# Patient Record
Sex: FEMALE | Race: WHITE | Employment: OTHER | URBAN - METROPOLITAN AREA
[De-identification: names, ages, dates, MRNs, and addresses within clinical notes are randomized per-mention and may not be internally consistent; named-entity substitution may affect disease eponyms.]

---

## 2017-02-16 PROCEDURE — 36415 COLL VENOUS BLD VENIPUNCTURE: CPT | Performed by: NURSE PRACTITIONER

## 2017-02-16 PROCEDURE — 85025 COMPLETE CBC W/AUTO DIFF WBC: CPT | Performed by: NURSE PRACTITIONER

## 2017-02-16 PROCEDURE — 80053 COMPREHEN METABOLIC PANEL: CPT | Performed by: NURSE PRACTITIONER

## 2017-05-26 PROBLEM — Z51.5 PALLIATIVE CARE STATUS: Status: ACTIVE | Noted: 2017-05-26

## 2017-12-14 ENCOUNTER — APPOINTMENT (OUTPATIENT)
Dept: GENERAL RADIOLOGY | Facility: HOSPITAL | Age: 82
DRG: 493 | End: 2017-12-14
Attending: EMERGENCY MEDICINE
Payer: MEDICARE

## 2017-12-14 ENCOUNTER — HOSPITAL ENCOUNTER (INPATIENT)
Facility: HOSPITAL | Age: 82
LOS: 5 days | Discharge: NURSING, REHAB OR LONG-TERM CARE | DRG: 493 | End: 2017-12-19
Attending: EMERGENCY MEDICINE | Admitting: INTERNAL MEDICINE
Payer: MEDICARE

## 2017-12-14 DIAGNOSIS — G20 PARKINSON'S DISEASE (HCC): ICD-10-CM

## 2017-12-14 DIAGNOSIS — S82.842A BIMALLEOLAR FRACTURE OF LEFT ANKLE, CLOSED, INITIAL ENCOUNTER: Primary | ICD-10-CM

## 2017-12-14 PROCEDURE — 93005 ELECTROCARDIOGRAM TRACING: CPT

## 2017-12-14 PROCEDURE — 85610 PROTHROMBIN TIME: CPT | Performed by: INTERNAL MEDICINE

## 2017-12-14 PROCEDURE — 80053 COMPREHEN METABOLIC PANEL: CPT | Performed by: INTERNAL MEDICINE

## 2017-12-14 PROCEDURE — 85025 COMPLETE CBC W/AUTO DIFF WBC: CPT | Performed by: INTERNAL MEDICINE

## 2017-12-14 PROCEDURE — 93010 ELECTROCARDIOGRAM REPORT: CPT | Performed by: INTERNAL MEDICINE

## 2017-12-14 PROCEDURE — 29515 APPLICATION SHORT LEG SPLINT: CPT

## 2017-12-14 PROCEDURE — 87641 MR-STAPH DNA AMP PROBE: CPT | Performed by: INTERNAL MEDICINE

## 2017-12-14 PROCEDURE — S0028 INJECTION, FAMOTIDINE, 20 MG: HCPCS | Performed by: INTERNAL MEDICINE

## 2017-12-14 PROCEDURE — 73610 X-RAY EXAM OF ANKLE: CPT | Performed by: EMERGENCY MEDICINE

## 2017-12-14 PROCEDURE — 99285 EMERGENCY DEPT VISIT HI MDM: CPT

## 2017-12-14 PROCEDURE — 96374 THER/PROPH/DIAG INJ IV PUSH: CPT

## 2017-12-14 PROCEDURE — 85730 THROMBOPLASTIN TIME PARTIAL: CPT | Performed by: INTERNAL MEDICINE

## 2017-12-14 PROCEDURE — 71020 XR CHEST PA + LAT CHEST (CPT=71020): CPT | Performed by: EMERGENCY MEDICINE

## 2017-12-14 RX ORDER — MORPHINE SULFATE 2 MG/ML
2 INJECTION, SOLUTION INTRAMUSCULAR; INTRAVENOUS EVERY 4 HOURS PRN
Status: DISCONTINUED | OUTPATIENT
Start: 2017-12-14 | End: 2017-12-19

## 2017-12-14 RX ORDER — FAMOTIDINE 10 MG/ML
20 INJECTION, SOLUTION INTRAVENOUS 2 TIMES DAILY
Status: DISCONTINUED | OUTPATIENT
Start: 2017-12-14 | End: 2017-12-15

## 2017-12-14 RX ORDER — HYDROCODONE BITARTRATE AND ACETAMINOPHEN 5; 325 MG/1; MG/1
1 TABLET ORAL EVERY 6 HOURS PRN
Status: DISCONTINUED | OUTPATIENT
Start: 2017-12-14 | End: 2017-12-19

## 2017-12-14 RX ORDER — MORPHINE SULFATE 4 MG/ML
4 INJECTION, SOLUTION INTRAMUSCULAR; INTRAVENOUS ONCE
Status: COMPLETED | OUTPATIENT
Start: 2017-12-14 | End: 2017-12-14

## 2017-12-14 RX ORDER — HYDROCODONE BITARTRATE AND ACETAMINOPHEN 5; 325 MG/1; MG/1
2 TABLET ORAL EVERY 6 HOURS PRN
Status: DISCONTINUED | OUTPATIENT
Start: 2017-12-14 | End: 2017-12-19

## 2017-12-14 RX ORDER — ACETAMINOPHEN 325 MG/1
650 TABLET ORAL EVERY 4 HOURS PRN
Status: DISCONTINUED | OUTPATIENT
Start: 2017-12-14 | End: 2017-12-19

## 2017-12-14 RX ORDER — DOXEPIN HYDROCHLORIDE 50 MG/1
1 CAPSULE ORAL DAILY
Status: DISCONTINUED | OUTPATIENT
Start: 2017-12-15 | End: 2017-12-19

## 2017-12-14 RX ORDER — GUAIFENESIN 600 MG
600 TABLET, EXTENDED RELEASE 12 HR ORAL 2 TIMES DAILY
Status: DISCONTINUED | OUTPATIENT
Start: 2017-12-14 | End: 2017-12-19

## 2017-12-14 RX ORDER — MORPHINE SULFATE 4 MG/ML
INJECTION, SOLUTION INTRAMUSCULAR; INTRAVENOUS
Status: COMPLETED
Start: 2017-12-14 | End: 2017-12-14

## 2017-12-14 RX ORDER — ACETAMINOPHEN 325 MG/1
325 TABLET ORAL EVERY 4 HOURS PRN
Status: DISCONTINUED | OUTPATIENT
Start: 2017-12-14 | End: 2017-12-14

## 2017-12-14 RX ORDER — ALBUTEROL SULFATE 2.5 MG/3ML
2.5 SOLUTION RESPIRATORY (INHALATION) EVERY 6 HOURS PRN
Status: DISCONTINUED | OUTPATIENT
Start: 2017-12-14 | End: 2017-12-19

## 2017-12-14 NOTE — CONSULTS
12/14/2017  Lizeth Pichardo  58/1933  80year old   female  No name on file.     HPI:   Patient presents with:  Lower Extremity Injury (musculoskeletal)  I was asked in consultation by Dr. Luisa Escudero to see and evaluate Lizeth Pichardo in consultation emma of Onset   • Breast Cancer Sister    • Hypertension Brother    • Stroke Brother    • coronary artery disease [Other] [OTHER] Father    • diverticulosis [Other] [OTHER] Mother       Smoking status: Former Smoker internal fixation of her bimalleolar ankle fracture. Risks include bleeding, infection, neurovascular injury, failure of the procedure, stiffness, and potential need for additional surgery as well as anesthetic complications including death.   Risks of fra

## 2017-12-14 NOTE — ED PROVIDER NOTES
Patient Seen in: [facility] AND [facility] Emergency Department    History   Patient presents with:  Lower Extremity Injury (musculoskeletal)    Stated Complaint: Fall, ankle swelling    HPI    Patient is an 80-year-old female with a long-standing history of sev Constitutional: She appears well-developed and well-nourished. HENT:   Head: Normocephalic and atraumatic. Eyes: Conjunctivae and EOM are normal. Pupils are equal, round, and reactive to light. Neck: Neck supple.    Cardiovascular: Normal rate, regula

## 2017-12-14 NOTE — PROGRESS NOTES
12/14/17 3419   Clinical Encounter Type   Visited With Patient and family together   Routine Visit Introduction   Continue Visiting Yes   Referral From Other (Comment)  (Consult)   Referral To    Patient Spiritual Encounters   Spiritual Assessme

## 2017-12-14 NOTE — ED INITIAL ASSESSMENT (HPI)
Pt was transferring from a commode to her bed at 1830 last night and she \"rolled\" her left ankle. Today has swelling, bruising to left ankle. PMSCs intact.

## 2017-12-14 NOTE — CM/SW NOTE
Met with patient and daughter for discharge planning. Patient lives in two stry home on second level with her  and a full time caregiver. She has a walker and bedside commonde at home.  Due to her Parkinsons she needs 1 assist with transfers and walk

## 2017-12-15 ENCOUNTER — APPOINTMENT (OUTPATIENT)
Dept: GENERAL RADIOLOGY | Facility: HOSPITAL | Age: 82
DRG: 493 | End: 2017-12-15
Attending: ORTHOPAEDIC SURGERY
Payer: MEDICARE

## 2017-12-15 ENCOUNTER — SURGERY (OUTPATIENT)
Age: 82
End: 2017-12-15

## 2017-12-15 ENCOUNTER — ANESTHESIA EVENT (OUTPATIENT)
Dept: SURGERY | Facility: HOSPITAL | Age: 82
End: 2017-12-15

## 2017-12-15 ENCOUNTER — ANESTHESIA (OUTPATIENT)
Dept: SURGERY | Facility: HOSPITAL | Age: 82
End: 2017-12-15

## 2017-12-15 PROCEDURE — BQ1H1ZZ FLUOROSCOPY OF LEFT ANKLE USING LOW OSMOLAR CONTRAST: ICD-10-PCS | Performed by: ORTHOPAEDIC SURGERY

## 2017-12-15 PROCEDURE — 51701 INSERT BLADDER CATHETER: CPT

## 2017-12-15 PROCEDURE — 76001 XR C-ARM FLUORO >1 HOUR  (CPT=76001): CPT | Performed by: ORTHOPAEDIC SURGERY

## 2017-12-15 PROCEDURE — 0QSH04Z REPOSITION LEFT TIBIA WITH INTERNAL FIXATION DEVICE, OPEN APPROACH: ICD-10-PCS | Performed by: ORTHOPAEDIC SURGERY

## 2017-12-15 PROCEDURE — 73600 X-RAY EXAM OF ANKLE: CPT | Performed by: ORTHOPAEDIC SURGERY

## 2017-12-15 PROCEDURE — 81003 URINALYSIS AUTO W/O SCOPE: CPT | Performed by: INTERNAL MEDICINE

## 2017-12-15 PROCEDURE — 0QSK04Z REPOSITION LEFT FIBULA WITH INTERNAL FIXATION DEVICE, OPEN APPROACH: ICD-10-PCS | Performed by: ORTHOPAEDIC SURGERY

## 2017-12-15 DEVICE — IMPLANTABLE DEVICE: Type: IMPLANTABLE DEVICE | Site: ANKLE | Status: FUNCTIONAL

## 2017-12-15 DEVICE — SCREW LOCKING 3.5X16 212.104: Type: IMPLANTABLE DEVICE | Site: ANKLE | Status: FUNCTIONAL

## 2017-12-15 DEVICE — SCREW LOCKING 3.5X14 212.103: Type: IMPLANTABLE DEVICE | Site: ANKLE | Status: FUNCTIONAL

## 2017-12-15 DEVICE — SCREW LOCKING 3.5X12 212.102: Type: IMPLANTABLE DEVICE | Site: ANKLE | Status: FUNCTIONAL

## 2017-12-15 DEVICE — SCREW LOCKING 3.5X20 212.106: Type: IMPLANTABLE DEVICE | Site: ANKLE | Status: FUNCTIONAL

## 2017-12-15 RX ORDER — SODIUM CHLORIDE 0.9 % (FLUSH) 0.9 %
10 SYRINGE (ML) INJECTION AS NEEDED
Status: DISCONTINUED | OUTPATIENT
Start: 2017-12-15 | End: 2017-12-19

## 2017-12-15 RX ORDER — HYDROMORPHONE HYDROCHLORIDE 1 MG/ML
0.6 INJECTION, SOLUTION INTRAMUSCULAR; INTRAVENOUS; SUBCUTANEOUS
Status: ACTIVE | OUTPATIENT
Start: 2017-12-15 | End: 2017-12-16

## 2017-12-15 RX ORDER — ACETAMINOPHEN 325 MG/1
650 TABLET ORAL EVERY 6 HOURS PRN
Status: DISCONTINUED | OUTPATIENT
Start: 2017-12-15 | End: 2017-12-19

## 2017-12-15 RX ORDER — HYDROMORPHONE HYDROCHLORIDE 1 MG/ML
0.4 INJECTION, SOLUTION INTRAMUSCULAR; INTRAVENOUS; SUBCUTANEOUS
Status: ACTIVE | OUTPATIENT
Start: 2017-12-15 | End: 2017-12-16

## 2017-12-15 RX ORDER — DIPHENHYDRAMINE HYDROCHLORIDE 50 MG/ML
12.5 INJECTION INTRAMUSCULAR; INTRAVENOUS EVERY 4 HOURS PRN
Status: ACTIVE | OUTPATIENT
Start: 2017-12-15 | End: 2017-12-16

## 2017-12-15 RX ORDER — ONDANSETRON 2 MG/ML
4 INJECTION INTRAMUSCULAR; INTRAVENOUS ONCE AS NEEDED
Status: ACTIVE | OUTPATIENT
Start: 2017-12-15 | End: 2017-12-15

## 2017-12-15 RX ORDER — ENOXAPARIN SODIUM 100 MG/ML
40 INJECTION SUBCUTANEOUS DAILY
Status: DISCONTINUED | OUTPATIENT
Start: 2017-12-16 | End: 2017-12-19

## 2017-12-15 RX ORDER — CEFAZOLIN SODIUM/WATER 2 G/20 ML
2 SYRINGE (ML) INTRAVENOUS
Status: COMPLETED | OUTPATIENT
Start: 2017-12-15 | End: 2017-12-15

## 2017-12-15 RX ORDER — SENNOSIDES 8.6 MG
17.2 TABLET ORAL NIGHTLY
Status: DISCONTINUED | OUTPATIENT
Start: 2017-12-15 | End: 2017-12-19

## 2017-12-15 RX ORDER — DIPHENHYDRAMINE HCL 25 MG
25 CAPSULE ORAL EVERY 4 HOURS PRN
Status: ACTIVE | OUTPATIENT
Start: 2017-12-15 | End: 2017-12-16

## 2017-12-15 RX ORDER — DOCUSATE SODIUM 100 MG/1
100 CAPSULE, LIQUID FILLED ORAL 2 TIMES DAILY
Status: DISCONTINUED | OUTPATIENT
Start: 2017-12-15 | End: 2017-12-19

## 2017-12-15 RX ORDER — HYDROCODONE BITARTRATE AND ACETAMINOPHEN 7.5; 325 MG/1; MG/1
1 TABLET ORAL EVERY 6 HOURS PRN
Status: DISCONTINUED | OUTPATIENT
Start: 2017-12-15 | End: 2017-12-19

## 2017-12-15 RX ORDER — SODIUM CHLORIDE, SODIUM LACTATE, POTASSIUM CHLORIDE, CALCIUM CHLORIDE 600; 310; 30; 20 MG/100ML; MG/100ML; MG/100ML; MG/100ML
INJECTION, SOLUTION INTRAVENOUS
Status: COMPLETED
Start: 2017-12-15 | End: 2017-12-15

## 2017-12-15 RX ORDER — SODIUM CHLORIDE, SODIUM LACTATE, POTASSIUM CHLORIDE, CALCIUM CHLORIDE 600; 310; 30; 20 MG/100ML; MG/100ML; MG/100ML; MG/100ML
INJECTION, SOLUTION INTRAVENOUS CONTINUOUS PRN
Status: DISCONTINUED | OUTPATIENT
Start: 2017-12-15 | End: 2017-12-15 | Stop reason: SURG

## 2017-12-15 RX ORDER — HYDROCODONE BITARTRATE AND ACETAMINOPHEN 5; 325 MG/1; MG/1
2 TABLET ORAL EVERY 4 HOURS PRN
Status: DISCONTINUED | OUTPATIENT
Start: 2017-12-15 | End: 2017-12-19

## 2017-12-15 RX ORDER — SODIUM CHLORIDE, SODIUM LACTATE, POTASSIUM CHLORIDE, CALCIUM CHLORIDE 600; 310; 30; 20 MG/100ML; MG/100ML; MG/100ML; MG/100ML
INJECTION, SOLUTION INTRAVENOUS CONTINUOUS
Status: DISCONTINUED | OUTPATIENT
Start: 2017-12-15 | End: 2017-12-19

## 2017-12-15 RX ORDER — BISACODYL 10 MG
10 SUPPOSITORY, RECTAL RECTAL
Status: DISCONTINUED | OUTPATIENT
Start: 2017-12-15 | End: 2017-12-19

## 2017-12-15 RX ORDER — NALOXONE HYDROCHLORIDE 0.4 MG/ML
0.08 INJECTION, SOLUTION INTRAMUSCULAR; INTRAVENOUS; SUBCUTANEOUS
Status: ACTIVE | OUTPATIENT
Start: 2017-12-15 | End: 2017-12-16

## 2017-12-15 RX ORDER — HYDROCODONE BITARTRATE AND ACETAMINOPHEN 5; 325 MG/1; MG/1
1 TABLET ORAL EVERY 4 HOURS PRN
Status: DISCONTINUED | OUTPATIENT
Start: 2017-12-15 | End: 2017-12-19

## 2017-12-15 RX ORDER — HYDROCODONE BITARTRATE AND ACETAMINOPHEN 5; 325 MG/1; MG/1
2 TABLET ORAL AS NEEDED
Status: DISCONTINUED | OUTPATIENT
Start: 2017-12-15 | End: 2017-12-15 | Stop reason: HOSPADM

## 2017-12-15 RX ORDER — ACETAMINOPHEN 325 MG/1
650 TABLET ORAL EVERY 4 HOURS PRN
Status: DISCONTINUED | OUTPATIENT
Start: 2017-12-15 | End: 2017-12-19

## 2017-12-15 RX ORDER — POLYETHYLENE GLYCOL 3350 17 G/17G
17 POWDER, FOR SOLUTION ORAL DAILY PRN
Status: DISCONTINUED | OUTPATIENT
Start: 2017-12-15 | End: 2017-12-19

## 2017-12-15 RX ORDER — HYDROMORPHONE HYDROCHLORIDE 1 MG/ML
0.6 INJECTION, SOLUTION INTRAMUSCULAR; INTRAVENOUS; SUBCUTANEOUS EVERY 5 MIN PRN
Status: DISCONTINUED | OUTPATIENT
Start: 2017-12-15 | End: 2017-12-15 | Stop reason: HOSPADM

## 2017-12-15 RX ORDER — FAMOTIDINE 10 MG/ML
20 INJECTION, SOLUTION INTRAVENOUS NIGHTLY
Status: DISCONTINUED | OUTPATIENT
Start: 2017-12-15 | End: 2017-12-19

## 2017-12-15 RX ORDER — CEFAZOLIN SODIUM/WATER 2 G/20 ML
2 SYRINGE (ML) INTRAVENOUS EVERY 8 HOURS
Status: COMPLETED | OUTPATIENT
Start: 2017-12-16 | End: 2017-12-16

## 2017-12-15 RX ORDER — MORPHINE SULFATE 2 MG/ML
2 INJECTION, SOLUTION INTRAMUSCULAR; INTRAVENOUS EVERY 10 MIN PRN
Status: DISCONTINUED | OUTPATIENT
Start: 2017-12-15 | End: 2017-12-15 | Stop reason: HOSPADM

## 2017-12-15 RX ORDER — NALOXONE HYDROCHLORIDE 0.4 MG/ML
80 INJECTION, SOLUTION INTRAMUSCULAR; INTRAVENOUS; SUBCUTANEOUS AS NEEDED
Status: DISCONTINUED | OUTPATIENT
Start: 2017-12-15 | End: 2017-12-15 | Stop reason: HOSPADM

## 2017-12-15 RX ORDER — NALBUPHINE HCL 10 MG/ML
2.5 AMPUL (ML) INJECTION EVERY 4 HOURS PRN
Status: DISCONTINUED | OUTPATIENT
Start: 2017-12-15 | End: 2017-12-19

## 2017-12-15 RX ORDER — MORPHINE SULFATE 10 MG/ML
6 INJECTION, SOLUTION INTRAMUSCULAR; INTRAVENOUS EVERY 10 MIN PRN
Status: DISCONTINUED | OUTPATIENT
Start: 2017-12-15 | End: 2017-12-15 | Stop reason: HOSPADM

## 2017-12-15 RX ORDER — ONDANSETRON 2 MG/ML
4 INJECTION INTRAMUSCULAR; INTRAVENOUS ONCE AS NEEDED
Status: DISCONTINUED | OUTPATIENT
Start: 2017-12-15 | End: 2017-12-15 | Stop reason: HOSPADM

## 2017-12-15 RX ORDER — SODIUM PHOSPHATE, DIBASIC AND SODIUM PHOSPHATE, MONOBASIC 7; 19 G/133ML; G/133ML
1 ENEMA RECTAL ONCE AS NEEDED
Status: DISCONTINUED | OUTPATIENT
Start: 2017-12-15 | End: 2017-12-19

## 2017-12-15 RX ORDER — SODIUM CHLORIDE, SODIUM LACTATE, POTASSIUM CHLORIDE, CALCIUM CHLORIDE 600; 310; 30; 20 MG/100ML; MG/100ML; MG/100ML; MG/100ML
INJECTION, SOLUTION INTRAVENOUS CONTINUOUS
Status: DISCONTINUED | OUTPATIENT
Start: 2017-12-15 | End: 2017-12-15 | Stop reason: HOSPADM

## 2017-12-15 RX ORDER — MORPHINE SULFATE 4 MG/ML
4 INJECTION, SOLUTION INTRAMUSCULAR; INTRAVENOUS EVERY 10 MIN PRN
Status: DISCONTINUED | OUTPATIENT
Start: 2017-12-15 | End: 2017-12-15 | Stop reason: HOSPADM

## 2017-12-15 RX ORDER — HYDROCODONE BITARTRATE AND ACETAMINOPHEN 7.5; 325 MG/1; MG/1
2 TABLET ORAL EVERY 6 HOURS PRN
Status: DISCONTINUED | OUTPATIENT
Start: 2017-12-15 | End: 2017-12-19

## 2017-12-15 RX ORDER — 0.9 % SODIUM CHLORIDE 0.9 %
VIAL (ML) INJECTION
Status: DISPENSED
Start: 2017-12-15 | End: 2017-12-16

## 2017-12-15 RX ORDER — HYDROMORPHONE HYDROCHLORIDE 1 MG/ML
0.4 INJECTION, SOLUTION INTRAMUSCULAR; INTRAVENOUS; SUBCUTANEOUS EVERY 5 MIN PRN
Status: DISCONTINUED | OUTPATIENT
Start: 2017-12-15 | End: 2017-12-15 | Stop reason: HOSPADM

## 2017-12-15 RX ORDER — HYDROCODONE BITARTRATE AND ACETAMINOPHEN 5; 325 MG/1; MG/1
1 TABLET ORAL AS NEEDED
Status: DISCONTINUED | OUTPATIENT
Start: 2017-12-15 | End: 2017-12-15 | Stop reason: HOSPADM

## 2017-12-15 RX ORDER — HYDROMORPHONE HYDROCHLORIDE 1 MG/ML
0.2 INJECTION, SOLUTION INTRAMUSCULAR; INTRAVENOUS; SUBCUTANEOUS EVERY 5 MIN PRN
Status: DISCONTINUED | OUTPATIENT
Start: 2017-12-15 | End: 2017-12-15 | Stop reason: HOSPADM

## 2017-12-15 RX ADMIN — CEFAZOLIN SODIUM/WATER 2 G: 2 G/20 ML SYRINGE (ML) INTRAVENOUS at 16:44:00

## 2017-12-15 RX ADMIN — SODIUM CHLORIDE, SODIUM LACTATE, POTASSIUM CHLORIDE, CALCIUM CHLORIDE: 600; 310; 30; 20 INJECTION, SOLUTION INTRAVENOUS at 18:21:00

## 2017-12-15 RX ADMIN — SODIUM CHLORIDE, SODIUM LACTATE, POTASSIUM CHLORIDE, CALCIUM CHLORIDE: 600; 310; 30; 20 INJECTION, SOLUTION INTRAVENOUS at 16:24:00

## 2017-12-15 NOTE — PROGRESS NOTES
[facility] Note:  Renal Dose Adjustment    Saundra Carrel has been prescribed famotidine (PEPCID) 20 mg intravenously every 12 hours. Estimated Creatinine Clearance: 36.9 mL/min (based on SCr of 0.94 mg/dL).     Her calculated creatinine clearance i

## 2017-12-15 NOTE — ANESTHESIA PROCEDURE NOTES
Spinal Block  Performed by: Kathrine Pap by: Myah Chao     Patient Location:  OR  Start Time:  12/15/2017 4:37 PM  End Time:  12/15/2017 4:43 PM  Site identification: surface landmarks    Anesthesiologist:  Myah Chao  Performed by

## 2017-12-15 NOTE — PLAN OF CARE
DISCHARGE PLANNING    • Discharge to home or other facility with appropriate resources Progressing        MUSCULOSKELETAL - ADULT    • Maintain proper alignment of affected body part Progressing        PAIN - ADULT    • Verbalizes/displays adequate comfort

## 2017-12-15 NOTE — PROGRESS NOTES
[facility]    Progress Note    Kip Rodrigues Patient Status:  Inpatient    1933 MRN [id number]   Location [facility] 4W/SW/SE Attending Francia Dumas MD   Hosp Day # 1 PCP Kathy Parisi MD        Subjective:     Winnie Reese discussed above with instability of the tibiotalar joint. Correlate clinically with post reduction imaging advised.          Ekg 12-lead    Result Date: 12/14/2017  ECG Report  Interpretation  --------------------------         Stephanie Trinidad MD  12/15/

## 2017-12-15 NOTE — ANESTHESIA PREPROCEDURE EVALUATION
Anesthesia PreOp Note    HPI:     Manjula Thapa is a 80year old female who presents for preoperative consultation requested by: Cody Yost MD    Date of Surgery: 12/14/2017 - 12/15/2017    Procedure(s):  ANKLE OPEN REDUCTION INTERNAL FIXATION exceed 10 days at at time Disp: 60 g Rfl: 0        Current Facility-Administered Medications Ordered in Epic:  [MAR Hold] ceFAZolin sodium (ANCEF/KEFZOL) 2 GM/20ML premix IV syringe 2 g 2 g Intravenous On Call to Aylin Lee MD    [facility] Hold] humbertooT labs reviewed.     Lab Results  Component Value Date   WBC 8.8 12/14/2017   RBC 3.89 12/14/2017   HGB 12.2 12/14/2017   HCT 36.1 12/14/2017   MCV 92.8 12/14/2017   MCH 31.3 12/14/2017   MCHC 33.7 12/14/2017   RDW 14.0 12/14/2017    12/14/2017   MPV 8 DARREN YOON  12/15/2017 4:21 PM

## 2017-12-15 NOTE — CM/SW NOTE
12/15-Dr. Pal [facility] informed this Writer that the Patient's admission will be inpatient. This Writer met with the Patient's son All Howard ([phone]) and Napoleon Gifford ([phone]) at bedside in regards to discharge planning.  The Patient's son is requesting a

## 2017-12-15 NOTE — H&P
[address] [facility] Patient Status:  Inpatient    1933 MRN [id number]   Location [facility] 4W/SW/SE Attending Carissa French MD   Hosp Day # 0 PCP Wiley Mcdowell MD     Date:  2017 aorta [facility])     CT of abdomen done February 2013   • H/O hernia repair    • HTN (hypertension)    • Parkinson disease ([facility] [address]      Past Surgical History:  No date: OTHER      Comment: bilateral hip replacement  No date: OTHER      Comment: left inguinal kelechi Denies h/o anemia; denies bruising or excessive bleeding   ENDOCRINE:  Denies excessive thirst or urination; denies unexpected wt gain or wt loss   ALLERGY/IMMUN: Denies food or seasonal allergies       Physical Exam:   /53 (BP Location: Right arm) Assessment/Plan:     Principal Problem:     Bimalleolar fracture of left ankle, closed, initial encounter  Seen by ortho and case discussed with consultant.   Surgical repair is recommended and family in agreement.  -will check EKG, UA and labs  -keep

## 2017-12-16 PROBLEM — D62 ANEMIA ASSOCIATED WITH ACUTE BLOOD LOSS: Status: ACTIVE | Noted: 2017-12-16

## 2017-12-16 PROCEDURE — 97167 OT EVAL HIGH COMPLEX 60 MIN: CPT

## 2017-12-16 PROCEDURE — 97163 PT EVAL HIGH COMPLEX 45 MIN: CPT

## 2017-12-16 PROCEDURE — 80048 BASIC METABOLIC PNL TOTAL CA: CPT | Performed by: ORTHOPAEDIC SURGERY

## 2017-12-16 PROCEDURE — 85027 COMPLETE CBC AUTOMATED: CPT | Performed by: ORTHOPAEDIC SURGERY

## 2017-12-16 NOTE — OPERATIVE REPORT
[facility]    PATIENT'S NAME: Juan Luis Ocampo   ATTENDING PHYSICIAN: Senait Moore MD   OPERATING PHYSICIAN: Barbara Espinoza MD   PATIENT ACCOUNT#:   [id number]    LOCATION:  [address] [place] [address] #:   [id number]       DATE OF table in a supine position. Spinal anesthesia was induced along with sedation. She was given Ancef as antibiotic prophylaxis within 1 hour of incision time. Her left upper extremity was prepped and draped in sterile fashion.   A tourniquet was inflated t Prolene skin suture. A large sterile bulky soft dressing was placed followed by a long leg splint with posterior mold and sugar tong component. The patient had a tourniquet deflated following wound closure.   Blood loss was minimal.  Complications were no

## 2017-12-16 NOTE — ANESTHESIA POSTPROCEDURE EVALUATION
Patient: Elida Nicholas    Procedure Summary     Date:  12/15/17 Room / Location:  [facility] OR [address] [place] [address] OR    Anesthesia Start:  5925 Anesthesia Stop:      Procedure:  ANKLE OPEN REDUCTION INTERNAL FIXATION (Left ) Diagnosis:       Bimalleolar frac

## 2017-12-16 NOTE — PHYSICAL THERAPY NOTE
PHYSICAL THERAPY EVALUATION - INPATIENT     Room Number: 415/415-A  Evaluation Date: 12/16/2017  Type of Evaluation: Initial  Physician Order: PT Eval and Treat (bed to chair transfer, L HERMELINDA BLACKBURN, fall risk prevention)    Presenting Problem: L bimalleola of motion;Strengthening;Transfer training  Rehab Potential : Guarded  Frequency (Obs): Daily       PHYSICAL THERAPY MEDICAL/SOCIAL HISTORY     History related to current admission: ORIF on 12/15 by Dr. Ramón Fernando.     Problem List  Principal Problem:    Ángel Aldrich L Lower Extremity: Non-Weight Bearing    PAIN ASSESSMENT  Rating: Other (Comment) (pt points to L ankle when asked to point to pain)  Location: L ankle  Management Techniques: Activity promotion; Body mechanics; Relaxation;Repositioning    COGNITION supine>sit    Transfers: SPT max A x2, with assist from 3rd helper to maintain L LE NWB    Exercise/Education Provided:  Bed mobility  Body mechanics  Functional activity tolerated  Transfer training    Patient End of Session: Up in chair;Needs met;Call li

## 2017-12-16 NOTE — BRIEF OP NOTE
Pre-Operative Diagnosis: Bimalleolar fracture of left ankle, closed, initial encounter [S82.842J]     Post-Operative Diagnosis: Bimalleolar fracture of left ankle, closed, initial encounter [P41.002K]     Procedure Performed:   Procedure(s):  open reduc

## 2017-12-16 NOTE — ANESTHESIA POST-OP FOLLOW-UP NOTE
[facility]   Acute Pain Rounds Note  2017    Patient name: Alex Pham 80year old female  : 1933  MRN: [id number]    Diagnosis: (Y38.525V) Bimalleolar fracture of left ankle, closed, initial encounter  (primary encounter

## 2017-12-16 NOTE — PROGRESS NOTES
[place] [facility] - [facility]    Progress Note    Jean-Claude Abel Patient Status:  Inpatient    1933 MRN [id number]   Location [facility] 4W/SW/SE Attending Artem Guerrero MD   Hosp Day # 2 PCP Hortencia Rubio MD       Subjective:   Cath 12/14/2017  CONCLUSION:  1. Fractures of the lateral and medial malleoli as discussed above with instability of the tibiotalar joint. Correlate clinically with post reduction imaging advised.          Xr Ankle (2 View), Left (cpt=73600)    Result Date: 12/1

## 2017-12-16 NOTE — PROGRESS NOTES
12/14/2017  Ingrid Mitchell  58/1933  80year old   female  No name on file. HPI:   Patient presents with:  Lower Extremity Injury (musculoskeletal)      The patient complains of pain located left ankle. The pain is controlled.   The patient denies n

## 2017-12-17 PROBLEM — E44.0 MODERATE PROTEIN-CALORIE MALNUTRITION (HCC): Status: ACTIVE | Noted: 2017-12-17

## 2017-12-17 PROCEDURE — 97530 THERAPEUTIC ACTIVITIES: CPT

## 2017-12-17 PROCEDURE — 85025 COMPLETE CBC W/AUTO DIFF WBC: CPT | Performed by: INTERNAL MEDICINE

## 2017-12-17 PROCEDURE — 80069 RENAL FUNCTION PANEL: CPT | Performed by: INTERNAL MEDICINE

## 2017-12-17 NOTE — PLAN OF CARE
Problem: Patient/Family Goals  Goal: Patient/Family Short Term Goal  Patient's Short Term Goal:  Increase activity as toleraetd  Interventions:   - PT/OT working with patient  - patient assisted up to chair today with PT  - patient upright for meals  - See

## 2017-12-17 NOTE — PHYSICAL THERAPY NOTE
PHYSICAL THERAPY TREATMENT NOTE - INPATIENT    Room Number: 517/886-X       Presenting Problem: L bimalleolar ankle fracture    Problem List  Principal Problem:    Bimalleolar fracture of left ankle, closed, initial encounter  Active Problems:    [facility]           Static Standing: Dependent  Dynamic Standing: Dependent    ACTIVITY TOLERANCE  Liters of O2:  1    AM-PAC '6-Clicks' INPATIENT SHORT FORM - BASIC MOBILITY  How much difficulty does the patient currently have. ..  -   Turning over in bed (includi in preparation for discharge, with SV/min A from primary caregiver.     Goal #5   Current Status IN PROGRESS   Goal #6    Goal #6  Current Status

## 2017-12-17 NOTE — PROGRESS NOTES
[address] Patient Status:  Inpatient    1933 MRN [id number]   Location [facility] 4W/SW/SE Attending Aaron Gary MD   Hosp Day # 3 PCP Anamika Tello MD     Americati Blue is a 80year old fe dry)    Assessment:    Condition: In stable condition. (S/p ankle ORIF 12/15    1. Will cont PT as able; importance of non weight bearing was discussed at length with the daughter and pt  2. Will d/c to rehab when bed available).

## 2017-12-17 NOTE — PLAN OF CARE
DISCHARGE PLANNING    • Discharge to home or other facility with appropriate resources Progressing    Plan is to discharge to a rehab facility, family still unsure of location, Son was going to visit places today near him. SW on consult.      Lizbeth Falcon

## 2017-12-17 NOTE — PROGRESS NOTES
[facility]    Progress Note    Aleida Atkinson Patient Status:  Inpatient    1933 MRN [id number]   Location [facility] 4W/SW/SE Attending Kirstie Richards MD   Hosp Day # 3 PCP Shane Dean MD       Subjective:   Cath ORIF of bimalleolar left ankle fracture. Xr C-arm Fluoro >1 Hour  (cpt=76001)    Result Date: 12/15/2017  CONCLUSION: FLUOROSCOPY WAS PROVIDED IN THE OPERATING ROOM FOR DR. MELENDREZ. A RADIOLOGIST WAS NOT IN ATTENDANCE DURING THE PROCEDURE.

## 2017-12-18 PROCEDURE — 85025 COMPLETE CBC W/AUTO DIFF WBC: CPT | Performed by: INTERNAL MEDICINE

## 2017-12-18 PROCEDURE — 97530 THERAPEUTIC ACTIVITIES: CPT

## 2017-12-18 PROCEDURE — S0028 INJECTION, FAMOTIDINE, 20 MG: HCPCS | Performed by: INTERNAL MEDICINE

## 2017-12-18 PROCEDURE — 80069 RENAL FUNCTION PANEL: CPT | Performed by: INTERNAL MEDICINE

## 2017-12-18 RX ORDER — HYDROCHLOROTHIAZIDE 25 MG/1
25 TABLET ORAL DAILY
Status: DISCONTINUED | OUTPATIENT
Start: 2017-12-18 | End: 2017-12-19

## 2017-12-18 NOTE — PROGRESS NOTES
12/14/2017  Cristian Aragon  58/1933  80year old   female  No name on file. HPI:   Patient presents with:  Lower Extremity Injury (musculoskeletal)      The patient complains of pain located left leg. The pain is controlled and mild.   The patient d

## 2017-12-18 NOTE — PROGRESS NOTES
[place] [facility] - [facility]    Progress Note    Wendy Jones Patient Status:  Inpatient    1933 MRN [id number]   Location [facility] 4W/SW/SE Attending Jelani Lees MD   Hosp Day # 4 PCP Luz Blanton MD     Subjective:     Nicole Gracia MD  12/18/2017

## 2017-12-18 NOTE — PHYSICAL THERAPY NOTE
PHYSICAL THERAPY TREATMENT NOTE - INPATIENT    Room Number: 288/809-D       Presenting Problem: L bimalleolar ankle fracture    Problem List  Principal Problem:    Bimalleolar fracture of left ankle, closed, initial encounter  Active Problems:    [facility] Static Sitting: Fair -  Dynamic Sitting: Fair -           Static Standing: Dependent  Dynamic Standing: Dependent    ACTIVITY TOLERANCE  Room air    AM-PAC '6-Clicks' INPATIENT SHORT FORM - BASIC MOBILITY max A    Goal #4 Stairs are not a goal at this time. Goal #4   Current Status NT   Goal #5 Patient to demonstrate home activity/exercise instructions provided to patient in preparation for discharge, with SV/min A from primary caregiver.     Goal #5   Cu

## 2017-12-18 NOTE — DIETARY NOTE
ADULT NUTRITION INITIAL ASSESSMENT    Pt is at no nutrition risk. Pt does not meet malnutrition criteria. NUTRITION DIAGNOSIS/PROBLEM: No nutrition diagnosis     PATIENT STATUS: Received nursing consult to evaluate patient's nutritional status.  No hi

## 2017-12-18 NOTE — CM/SW NOTE
Explanation of of BPCI/Medicare program provided. Patient was enrolled under . Patient/family agreed to phone f/u for 3 months from [facility] after discharge from [address] [place] [address] - f/u phone calls to be taken by staff at rehab facility.  BPCI/Medicare douglas

## 2017-12-19 VITALS
TEMPERATURE: 99 F | SYSTOLIC BLOOD PRESSURE: 157 MMHG | WEIGHT: 140 LBS | HEART RATE: 84 BPM | BODY MASS INDEX: 24.8 KG/M2 | RESPIRATION RATE: 20 BRPM | DIASTOLIC BLOOD PRESSURE: 76 MMHG | HEIGHT: 63 IN | OXYGEN SATURATION: 92 %

## 2017-12-19 PROCEDURE — 80048 BASIC METABOLIC PNL TOTAL CA: CPT | Performed by: INTERNAL MEDICINE

## 2017-12-19 PROCEDURE — 97110 THERAPEUTIC EXERCISES: CPT

## 2017-12-19 PROCEDURE — 85025 COMPLETE CBC W/AUTO DIFF WBC: CPT | Performed by: INTERNAL MEDICINE

## 2017-12-19 PROCEDURE — 97530 THERAPEUTIC ACTIVITIES: CPT

## 2017-12-19 RX ORDER — HYDROCHLOROTHIAZIDE 25 MG/1
25 TABLET ORAL DAILY
Qty: 30 TABLET | Refills: 3 | Status: SHIPPED | OUTPATIENT
Start: 2017-12-20

## 2017-12-19 NOTE — PROGRESS NOTES
[facility]    Progress Note    Reema Rissa Patient Status:  Inpatient    1933 MRN [id number]   Location [facility] 4W/SW/SE Attending Slade Harding MD   Hosp Day # 5 PCP Eva Headley MD     Subjective:     Cons

## 2017-12-19 NOTE — CM/SW NOTE
The pt. Has been accepted at [facility] in [facility]. The pt. Is scheduled to discharge to [facility] today 12/19 at 6p, via ambulance due to being a max assist of 2. The pt.'s son Yang Loaiza is aware and agreeable to discharge.       Report

## 2017-12-19 NOTE — PHYSICAL THERAPY NOTE
PHYSICAL THERAPY TREATMENT NOTE - INPATIENT    Room Number: 681/344-X       Presenting Problem: L bimalleolar ankle fracture    Problem List  Principal Problem:    Bimalleolar fracture of left ankle, closed, initial encounter  Active Problems:    [facility] Static Sitting: Fair  Dynamic Sitting: Fair -           Static Standing: Dependent  Dynamic Standing: Dependent    ACTIVITY TOLERANCE  Room air    AM-PAC '6-Clicks' INPATIENT SHORT FORM - BASIC MOBILITY  How much difficulty does the patien Status NT performed SPT with max A x 2   Goal #4 Stairs are not a goal at this time.     Goal #4   Current Status NT   Goal #5 Patient to demonstrate home activity/exercise instructions provided to patient in preparation for discharge, with SV/min A from pr

## 2017-12-19 NOTE — CM/SW NOTE
SW received a call from the pt's son Ashly El [phone] regarding his rehab choice. Ashly El would like a referral to the [facility] in [facility]. Referral made and DON screen previously requested.       [facility] ext [phone]

## 2017-12-20 NOTE — DISCHARGE SUMMARY
[place] [facility] - [facility]    Discharge Summary    Jean-Claude Abel Patient Status:  Inpatient    1933 MRN [id number]   Location [facility] 4W/SW/SE Attending No att. providers found   Hosp Day # 5 PCP Hortencia Rubio MD     Date of Admis HOURS AS NEEDED   Quantity:  180 mL  Refills:  1        CONTINUE taking these medications      Instructions Prescription details   MUCINEX MAXIMUM STRENGTH 1200 MG Tb12  Generic drug:  GuaiFENesin ER      Take 1 tablet (1,200 mg total) by mouth 2 (two) chaim

## 2018-01-25 PROBLEM — T81.30XA WOUND DEHISCENCE: Status: ACTIVE | Noted: 2018-01-25

## 2018-01-25 PROBLEM — S82.842D CLOSED DISPLACED BIMALLEOLAR FRACTURE OF LEFT LOWER LEG WITH ROUTINE HEALING: Status: ACTIVE | Noted: 2018-01-25

## 2018-01-27 RX ORDER — GUAIFENESIN AND DEXTROMETHORPHAN HYDROBROMIDE 100; 10 MG/5ML; MG/5ML
10 SOLUTION ORAL EVERY 6 HOURS PRN
COMMUNITY

## 2018-01-27 RX ORDER — ENOXAPARIN SODIUM 150 MG/ML
60 INJECTION SUBCUTANEOUS NIGHTLY
COMMUNITY
End: 2018-02-01

## 2018-01-27 RX ORDER — ENOXAPARIN SODIUM 150 MG/ML
80 INJECTION SUBCUTANEOUS DAILY
COMMUNITY
End: 2018-02-01

## 2018-01-27 RX ORDER — POTASSIUM CHLORIDE 20 MEQ/1
20 TABLET, EXTENDED RELEASE ORAL DAILY
COMMUNITY

## 2018-01-27 RX ORDER — ACETAMINOPHEN 325 MG/1
650 TABLET ORAL EVERY 6 HOURS PRN
COMMUNITY

## 2018-01-27 RX ORDER — HYDROCODONE BITARTRATE AND ACETAMINOPHEN 5; 325 MG/1; MG/1
1 TABLET ORAL EVERY 6 HOURS PRN
COMMUNITY
End: 2018-02-01

## 2018-01-27 NOTE — PAT NURSING NOTE
Spoke with Bobo Orozco RN at [facility], she states last dose of Lovenox as instructed by Dr. Ximena Canchola  is to be given 24 hours pre-op.

## 2018-01-30 ENCOUNTER — APPOINTMENT (OUTPATIENT)
Dept: GENERAL RADIOLOGY | Facility: HOSPITAL | Age: 83
DRG: 908 | End: 2018-01-30
Attending: ORTHOPAEDIC SURGERY
Payer: MEDICARE

## 2018-01-30 ENCOUNTER — HOSPITAL ENCOUNTER (INPATIENT)
Facility: HOSPITAL | Age: 83
LOS: 2 days | Discharge: NURSING, REHAB OR LONG-TERM CARE | DRG: 908 | End: 2018-02-01
Attending: ORTHOPAEDIC SURGERY | Admitting: ORTHOPAEDIC SURGERY
Payer: MEDICARE

## 2018-01-30 ENCOUNTER — SURGERY (OUTPATIENT)
Age: 83
End: 2018-01-30

## 2018-01-30 ENCOUNTER — ANESTHESIA EVENT (OUTPATIENT)
Dept: SURGERY | Facility: HOSPITAL | Age: 83
DRG: 908 | End: 2018-01-30
Payer: MEDICARE

## 2018-01-30 ENCOUNTER — ANESTHESIA (OUTPATIENT)
Dept: SURGERY | Facility: HOSPITAL | Age: 83
DRG: 908 | End: 2018-01-30
Payer: MEDICARE

## 2018-01-30 DIAGNOSIS — T81.30XA WOUND DEHISCENCE: Primary | ICD-10-CM

## 2018-01-30 LAB — MRSA DNA SPEC QL NAA+PROBE: NEGATIVE

## 2018-01-30 PROCEDURE — 0HDNXZZ EXTRACTION OF LEFT FOOT SKIN, EXTERNAL APPROACH: ICD-10-PCS | Performed by: ORTHOPAEDIC SURGERY

## 2018-01-30 PROCEDURE — 73610 X-RAY EXAM OF ANKLE: CPT | Performed by: ORTHOPAEDIC SURGERY

## 2018-01-30 PROCEDURE — 0JQR0ZZ REPAIR LEFT FOOT SUBCUTANEOUS TISSUE AND FASCIA, OPEN APPROACH: ICD-10-PCS | Performed by: ORTHOPAEDIC SURGERY

## 2018-01-30 PROCEDURE — 87641 MR-STAPH DNA AMP PROBE: CPT | Performed by: ORTHOPAEDIC SURGERY

## 2018-01-30 PROCEDURE — A4216 STERILE WATER/SALINE, 10 ML: HCPCS | Performed by: ORTHOPAEDIC SURGERY

## 2018-01-30 PROCEDURE — 0SPG04Z REMOVAL OF INTERNAL FIXATION DEVICE FROM LEFT ANKLE JOINT, OPEN APPROACH: ICD-10-PCS | Performed by: ORTHOPAEDIC SURGERY

## 2018-01-30 PROCEDURE — 76001 XR C-ARM FLUORO >1 HOUR  (CPT=76001): CPT | Performed by: ORTHOPAEDIC SURGERY

## 2018-01-30 PROCEDURE — 88300 SURGICAL PATH GROSS: CPT | Performed by: ORTHOPAEDIC SURGERY

## 2018-01-30 RX ORDER — CEFAZOLIN SODIUM/WATER 2 G/20 ML
2 SYRINGE (ML) INTRAVENOUS ONCE
Status: COMPLETED | OUTPATIENT
Start: 2018-01-30 | End: 2018-01-30

## 2018-01-30 RX ORDER — HYDROCODONE BITARTRATE AND ACETAMINOPHEN 5; 325 MG/1; MG/1
1 TABLET ORAL EVERY 4 HOURS PRN
Status: DISCONTINUED | OUTPATIENT
Start: 2018-01-30 | End: 2018-02-01

## 2018-01-30 RX ORDER — MORPHINE SULFATE 2 MG/ML
1 INJECTION, SOLUTION INTRAMUSCULAR; INTRAVENOUS EVERY 2 HOUR PRN
Status: DISCONTINUED | OUTPATIENT
Start: 2018-01-30 | End: 2018-02-01

## 2018-01-30 RX ORDER — HYDROCHLOROTHIAZIDE 25 MG/1
25 TABLET ORAL DAILY
Status: DISCONTINUED | OUTPATIENT
Start: 2018-01-30 | End: 2018-02-01

## 2018-01-30 RX ORDER — MORPHINE SULFATE 10 MG/ML
6 INJECTION, SOLUTION INTRAMUSCULAR; INTRAVENOUS EVERY 10 MIN PRN
Status: DISCONTINUED | OUTPATIENT
Start: 2018-01-30 | End: 2018-01-30 | Stop reason: HOSPADM

## 2018-01-30 RX ORDER — HALOPERIDOL 5 MG/ML
0.25 INJECTION INTRAMUSCULAR ONCE AS NEEDED
Status: DISCONTINUED | OUTPATIENT
Start: 2018-01-30 | End: 2018-01-30 | Stop reason: HOSPADM

## 2018-01-30 RX ORDER — DEXAMETHASONE SODIUM PHOSPHATE 4 MG/ML
VIAL (ML) INJECTION AS NEEDED
Status: DISCONTINUED | OUTPATIENT
Start: 2018-01-30 | End: 2018-01-30 | Stop reason: SURG

## 2018-01-30 RX ORDER — MORPHINE SULFATE 4 MG/ML
4 INJECTION, SOLUTION INTRAMUSCULAR; INTRAVENOUS EVERY 2 HOUR PRN
Status: DISCONTINUED | OUTPATIENT
Start: 2018-01-30 | End: 2018-02-01

## 2018-01-30 RX ORDER — MORPHINE SULFATE 2 MG/ML
2 INJECTION, SOLUTION INTRAMUSCULAR; INTRAVENOUS EVERY 2 HOUR PRN
Status: DISCONTINUED | OUTPATIENT
Start: 2018-01-30 | End: 2018-02-01

## 2018-01-30 RX ORDER — MORPHINE SULFATE 4 MG/ML
4 INJECTION, SOLUTION INTRAMUSCULAR; INTRAVENOUS EVERY 10 MIN PRN
Status: DISCONTINUED | OUTPATIENT
Start: 2018-01-30 | End: 2018-01-30 | Stop reason: HOSPADM

## 2018-01-30 RX ORDER — SODIUM CHLORIDE 0.9 % (FLUSH) 0.9 %
10 SYRINGE (ML) INJECTION AS NEEDED
Status: DISCONTINUED | OUTPATIENT
Start: 2018-01-30 | End: 2018-02-01

## 2018-01-30 RX ORDER — BISACODYL 10 MG
10 SUPPOSITORY, RECTAL RECTAL
Status: DISCONTINUED | OUTPATIENT
Start: 2018-01-30 | End: 2018-02-01

## 2018-01-30 RX ORDER — ACETAMINOPHEN 325 MG/1
650 TABLET ORAL EVERY 6 HOURS PRN
Status: DISCONTINUED | OUTPATIENT
Start: 2018-01-30 | End: 2018-02-01

## 2018-01-30 RX ORDER — ENOXAPARIN SODIUM 100 MG/ML
40 INJECTION SUBCUTANEOUS DAILY
Status: DISCONTINUED | OUTPATIENT
Start: 2018-01-31 | End: 2018-02-01

## 2018-01-30 RX ORDER — ONDANSETRON 2 MG/ML
INJECTION INTRAMUSCULAR; INTRAVENOUS AS NEEDED
Status: DISCONTINUED | OUTPATIENT
Start: 2018-01-30 | End: 2018-01-30 | Stop reason: SURG

## 2018-01-30 RX ORDER — ONDANSETRON 2 MG/ML
4 INJECTION INTRAMUSCULAR; INTRAVENOUS ONCE AS NEEDED
Status: DISCONTINUED | OUTPATIENT
Start: 2018-01-30 | End: 2018-01-30 | Stop reason: HOSPADM

## 2018-01-30 RX ORDER — ALBUTEROL SULFATE 2.5 MG/3ML
2.5 SOLUTION RESPIRATORY (INHALATION) EVERY 6 HOURS PRN
Status: DISCONTINUED | OUTPATIENT
Start: 2018-01-30 | End: 2018-02-01

## 2018-01-30 RX ORDER — ACETAMINOPHEN 325 MG/1
650 TABLET ORAL EVERY 4 HOURS PRN
Status: DISCONTINUED | OUTPATIENT
Start: 2018-01-30 | End: 2018-02-01

## 2018-01-30 RX ORDER — METOCLOPRAMIDE 10 MG/1
10 TABLET ORAL ONCE
Status: DISCONTINUED | OUTPATIENT
Start: 2018-01-30 | End: 2018-01-30 | Stop reason: HOSPADM

## 2018-01-30 RX ORDER — NALOXONE HYDROCHLORIDE 0.4 MG/ML
80 INJECTION, SOLUTION INTRAMUSCULAR; INTRAVENOUS; SUBCUTANEOUS AS NEEDED
Status: DISCONTINUED | OUTPATIENT
Start: 2018-01-30 | End: 2018-01-30 | Stop reason: HOSPADM

## 2018-01-30 RX ORDER — HYDROMORPHONE HYDROCHLORIDE 1 MG/ML
0.6 INJECTION, SOLUTION INTRAMUSCULAR; INTRAVENOUS; SUBCUTANEOUS EVERY 5 MIN PRN
Status: DISCONTINUED | OUTPATIENT
Start: 2018-01-30 | End: 2018-01-30 | Stop reason: HOSPADM

## 2018-01-30 RX ORDER — HYDROMORPHONE HYDROCHLORIDE 1 MG/ML
0.2 INJECTION, SOLUTION INTRAMUSCULAR; INTRAVENOUS; SUBCUTANEOUS EVERY 5 MIN PRN
Status: DISCONTINUED | OUTPATIENT
Start: 2018-01-30 | End: 2018-01-30 | Stop reason: HOSPADM

## 2018-01-30 RX ORDER — DOCUSATE SODIUM 100 MG/1
100 CAPSULE, LIQUID FILLED ORAL 2 TIMES DAILY
Status: DISCONTINUED | OUTPATIENT
Start: 2018-01-30 | End: 2018-02-01

## 2018-01-30 RX ORDER — METOPROLOL TARTRATE 5 MG/5ML
2.5 INJECTION INTRAVENOUS ONCE
Status: DISCONTINUED | OUTPATIENT
Start: 2018-01-30 | End: 2018-01-30 | Stop reason: HOSPADM

## 2018-01-30 RX ORDER — LIDOCAINE HYDROCHLORIDE 10 MG/ML
INJECTION, SOLUTION EPIDURAL; INFILTRATION; INTRACAUDAL; PERINEURAL AS NEEDED
Status: DISCONTINUED | OUTPATIENT
Start: 2018-01-30 | End: 2018-01-30 | Stop reason: SURG

## 2018-01-30 RX ORDER — SODIUM PHOSPHATE, DIBASIC AND SODIUM PHOSPHATE, MONOBASIC 7; 19 G/133ML; G/133ML
1 ENEMA RECTAL ONCE AS NEEDED
Status: DISCONTINUED | OUTPATIENT
Start: 2018-01-30 | End: 2018-02-01

## 2018-01-30 RX ORDER — HYDROCODONE BITARTRATE AND ACETAMINOPHEN 5; 325 MG/1; MG/1
2 TABLET ORAL EVERY 4 HOURS PRN
Status: DISCONTINUED | OUTPATIENT
Start: 2018-01-30 | End: 2018-02-01

## 2018-01-30 RX ORDER — LABETALOL HYDROCHLORIDE 5 MG/ML
10 INJECTION, SOLUTION INTRAVENOUS ONCE
Status: COMPLETED | OUTPATIENT
Start: 2018-01-30 | End: 2018-01-30

## 2018-01-30 RX ORDER — HYDROCODONE BITARTRATE AND ACETAMINOPHEN 5; 325 MG/1; MG/1
1 TABLET ORAL AS NEEDED
Status: DISCONTINUED | OUTPATIENT
Start: 2018-01-30 | End: 2018-01-30 | Stop reason: HOSPADM

## 2018-01-30 RX ORDER — POLYETHYLENE GLYCOL 3350 17 G/17G
17 POWDER, FOR SOLUTION ORAL DAILY PRN
Status: DISCONTINUED | OUTPATIENT
Start: 2018-01-30 | End: 2018-02-01

## 2018-01-30 RX ORDER — HYDROMORPHONE HYDROCHLORIDE 1 MG/ML
0.4 INJECTION, SOLUTION INTRAMUSCULAR; INTRAVENOUS; SUBCUTANEOUS EVERY 5 MIN PRN
Status: DISCONTINUED | OUTPATIENT
Start: 2018-01-30 | End: 2018-01-30 | Stop reason: HOSPADM

## 2018-01-30 RX ORDER — HYDROCODONE BITARTRATE AND ACETAMINOPHEN 5; 325 MG/1; MG/1
2 TABLET ORAL AS NEEDED
Status: DISCONTINUED | OUTPATIENT
Start: 2018-01-30 | End: 2018-01-30 | Stop reason: HOSPADM

## 2018-01-30 RX ORDER — ACETAMINOPHEN 500 MG
1000 TABLET ORAL ONCE
Status: DISCONTINUED | OUTPATIENT
Start: 2018-01-30 | End: 2018-01-30 | Stop reason: HOSPADM

## 2018-01-30 RX ORDER — SODIUM CHLORIDE, SODIUM LACTATE, POTASSIUM CHLORIDE, CALCIUM CHLORIDE 600; 310; 30; 20 MG/100ML; MG/100ML; MG/100ML; MG/100ML
INJECTION, SOLUTION INTRAVENOUS CONTINUOUS
Status: DISCONTINUED | OUTPATIENT
Start: 2018-01-30 | End: 2018-01-30 | Stop reason: HOSPADM

## 2018-01-30 RX ORDER — FAMOTIDINE 20 MG/1
20 TABLET ORAL ONCE
Status: DISCONTINUED | OUTPATIENT
Start: 2018-01-30 | End: 2018-01-30 | Stop reason: HOSPADM

## 2018-01-30 RX ORDER — MORPHINE SULFATE 2 MG/ML
2 INJECTION, SOLUTION INTRAMUSCULAR; INTRAVENOUS EVERY 10 MIN PRN
Status: DISCONTINUED | OUTPATIENT
Start: 2018-01-30 | End: 2018-01-30 | Stop reason: HOSPADM

## 2018-01-30 RX ORDER — SODIUM CHLORIDE, SODIUM LACTATE, POTASSIUM CHLORIDE, CALCIUM CHLORIDE 600; 310; 30; 20 MG/100ML; MG/100ML; MG/100ML; MG/100ML
INJECTION, SOLUTION INTRAVENOUS CONTINUOUS
Status: DISCONTINUED | OUTPATIENT
Start: 2018-01-30 | End: 2018-02-01

## 2018-01-30 RX ORDER — CEFAZOLIN SODIUM/WATER 2 G/20 ML
2 SYRINGE (ML) INTRAVENOUS EVERY 8 HOURS
Status: CANCELLED | OUTPATIENT
Start: 2018-01-30 | End: 2018-01-31

## 2018-01-30 RX ADMIN — DEXAMETHASONE SODIUM PHOSPHATE 4 MG: 4 MG/ML VIAL (ML) INJECTION at 18:15:00

## 2018-01-30 RX ADMIN — ONDANSETRON 4 MG: 2 INJECTION INTRAMUSCULAR; INTRAVENOUS at 18:15:00

## 2018-01-30 RX ADMIN — SODIUM CHLORIDE, SODIUM LACTATE, POTASSIUM CHLORIDE, CALCIUM CHLORIDE: 600; 310; 30; 20 INJECTION, SOLUTION INTRAVENOUS at 17:58:00

## 2018-01-30 RX ADMIN — CEFAZOLIN SODIUM/WATER 2 G: 2 G/20 ML SYRINGE (ML) INTRAVENOUS at 18:09:00

## 2018-01-30 RX ADMIN — LIDOCAINE HYDROCHLORIDE 50 MG: 10 INJECTION, SOLUTION EPIDURAL; INFILTRATION; INTRACAUDAL; PERINEURAL at 18:15:00

## 2018-01-30 RX ADMIN — SODIUM CHLORIDE, SODIUM LACTATE, POTASSIUM CHLORIDE, CALCIUM CHLORIDE: 600; 310; 30; 20 INJECTION, SOLUTION INTRAVENOUS at 18:45:00

## 2018-01-30 NOTE — ANESTHESIA PREPROCEDURE EVALUATION
Anesthesia PreOp Note    HPI:     Rosita Gilford is a 80year old female who presents for preoperative consultation requested by: Vu De Los Santos MD    Date of Surgery: 1/30/2018    Procedure(s):  EXTREMITY LOWER HARDWARE REMOVAL  Indication: wound skin daily. Disp:  Rfl:     Enoxaparin Sodium 150 MG/ML Subcutaneous Solution Inject 60 mg into the skin nightly. Disp:  Rfl:     HYDROcodone-acetaminophen 5-325 MG Oral Tab Take 1 tablet by mouth every 6 (six) hours as needed for Pain.  Disp:  Rfl:     Pot (ANCEF/KEFZOL) 2 GM/20ML premix IV syringe 2 g 2 g Intravenous Once Manuela MD Gamaliel     No current [facility]-ordered outpatient prescriptions on file.       Sulfa Antibiotics       Hives    Family History   Problem Relation Age of Onset   • diverticulosis [ meds) well controlled,     Neuro/Psych    (+) neuromuscular disease (Parkinson's disease),     GI/Hepatic/Renal - negative ROS     Endo/Other - negative ROS   Abdominal  - normal exam             Anesthesia Plan:   ASA:  2  Plan:   General  Airway:  LMA  P

## 2018-01-30 NOTE — H&P
Steve Ewing  58/1933  80year old   female  Mainor Bean MD     HPI:   Patient presents with: Ankle Pain: left ankle - 5 wks 6 days s/p ORIF        The patient complains of pain located left ankle. The pain is the same.   The patient denies num followed by second incision and drainage with possible wound closure.   Risks include bleeding, infection, neurovascular injury, failure of the procedure, stiffness, and potential need for additional surgery as well as anesthetic complications including golden

## 2018-01-31 LAB
ANION GAP SERPL CALC-SCNC: 7 MMOL/L (ref 0–18)
BASOPHILS # BLD: 0 K/UL (ref 0–0.2)
BASOPHILS NFR BLD: 0 %
BUN SERPL-MCNC: 16 MG/DL (ref 8–20)
BUN/CREAT SERPL: 15.8 (ref 10–20)
CALCIUM SERPL-MCNC: 10 MG/DL (ref 8.5–10.5)
CHLORIDE SERPL-SCNC: 103 MMOL/L (ref 95–110)
CO2 SERPL-SCNC: 28 MMOL/L (ref 22–32)
CREAT SERPL-MCNC: 1.01 MG/DL (ref 0.5–1.5)
EOSINOPHIL # BLD: 0 K/UL (ref 0–0.7)
EOSINOPHIL NFR BLD: 0 %
ERYTHROCYTE [DISTWIDTH] IN BLOOD BY AUTOMATED COUNT: 14.7 % (ref 11–15)
GLUCOSE SERPL-MCNC: 125 MG/DL (ref 70–99)
HCT VFR BLD AUTO: 33 % (ref 35–48)
HGB BLD-MCNC: 10.7 G/DL (ref 12–16)
LYMPHOCYTES # BLD: 0.7 K/UL (ref 1–4)
LYMPHOCYTES NFR BLD: 14 %
MCH RBC QN AUTO: 30.2 PG (ref 27–32)
MCHC RBC AUTO-ENTMCNC: 32.6 G/DL (ref 32–37)
MCV RBC AUTO: 92.7 FL (ref 80–100)
MONOCYTES # BLD: 0.2 K/UL (ref 0–1)
MONOCYTES NFR BLD: 3 %
NEUTROPHILS # BLD AUTO: 4.6 K/UL (ref 1.8–7.7)
NEUTROPHILS NFR BLD: 83 %
OSMOLALITY UR CALC.SUM OF ELEC: 289 MOSM/KG (ref 275–295)
PLATELET # BLD AUTO: 244 K/UL (ref 140–400)
PMV BLD AUTO: 7.9 FL (ref 7.4–10.3)
POTASSIUM SERPL-SCNC: 4.6 MMOL/L (ref 3.3–5.1)
RBC # BLD AUTO: 3.56 M/UL (ref 3.7–5.4)
SODIUM SERPL-SCNC: 138 MMOL/L (ref 136–144)
WBC # BLD AUTO: 5.5 K/UL (ref 4–11)

## 2018-01-31 PROCEDURE — 99211 OFF/OP EST MAY X REQ PHY/QHP: CPT

## 2018-01-31 PROCEDURE — A4216 STERILE WATER/SALINE, 10 ML: HCPCS

## 2018-01-31 PROCEDURE — 97162 PT EVAL MOD COMPLEX 30 MIN: CPT

## 2018-01-31 PROCEDURE — 97110 THERAPEUTIC EXERCISES: CPT

## 2018-01-31 PROCEDURE — 80048 BASIC METABOLIC PNL TOTAL CA: CPT | Performed by: ORTHOPAEDIC SURGERY

## 2018-01-31 PROCEDURE — 02HV33Z INSERTION OF INFUSION DEVICE INTO SUPERIOR VENA CAVA, PERCUTANEOUS APPROACH: ICD-10-PCS | Performed by: INTERNAL MEDICINE

## 2018-01-31 PROCEDURE — 85025 COMPLETE CBC W/AUTO DIFF WBC: CPT | Performed by: ORTHOPAEDIC SURGERY

## 2018-01-31 PROCEDURE — 76937 US GUIDE VASCULAR ACCESS: CPT

## 2018-01-31 PROCEDURE — 36569 INSJ PICC 5 YR+ W/O IMAGING: CPT

## 2018-01-31 RX ORDER — SODIUM CHLORIDE 0.9 % (FLUSH) 0.9 %
10 SYRINGE (ML) INJECTION AS NEEDED
Status: DISCONTINUED | OUTPATIENT
Start: 2018-01-31 | End: 2018-02-01

## 2018-01-31 RX ORDER — LIDOCAINE HYDROCHLORIDE 10 MG/ML
0.5 INJECTION, SOLUTION INFILTRATION; PERINEURAL ONCE AS NEEDED
Status: ACTIVE | OUTPATIENT
Start: 2018-01-31 | End: 2018-01-31

## 2018-01-31 RX ORDER — 0.9 % SODIUM CHLORIDE 0.9 %
VIAL (ML) INJECTION
Status: COMPLETED
Start: 2018-01-31 | End: 2018-01-31

## 2018-01-31 NOTE — PROGRESS NOTES
[facility] dosing service    Initial Pharmacokinetic Consult for Vancomycin Dosing     Ernst Peña is a 80year old female admitted on 1/30 who is being treated for MRSA infection. Pharmacy has been asked to dose Vancomycin by Dr. Octavio Elizabeth.     Sherice Woodruff [phone]

## 2018-01-31 NOTE — OPERATIVE REPORT
[facility]    PATIENT'S NAME: Clinton Fonseca   ATTENDING PHYSICIAN: Elmer Mccann MD   OPERATING PHYSICIAN: Elmer Mccann MD   PATIENT ACCOUNT#:   [id number]    LOCATION:  [address] [place] [address] #:   [id number]       DATE transferred to the operating room and transferred to the operating table in a supine position. General anesthesia was induced. Her left lower extremity was prepped and draped in a sterile fashion.   She received vancomycin as antibiotic prophylaxis within

## 2018-01-31 NOTE — PHYSICAL THERAPY NOTE
PHYSICAL THERAPY EVALUATION - INPATIENT     Room Number: 428/428-A  Evaluation Date: 1/31/2018  Type of Evaluation: Initial  Physician Order: PT Eval and Treat    Presenting Problem:  (L ankle HWR, NWB LLE)  Reason for Therapy: Mobility Dysfunction and D date: OTHER      Comment: left inguinal hernia    HOME SITUATION  Unknown    Prior Level of [place]: assist for stand pivot    SUBJECTIVE  Unable to communicate    PHYSICAL THERAPY EXAMINATION     OBJECTIVE  Precautions:  Other (Comment)  Fall Risk:  ( 2/14/17  Patient Goal Patient's self-stated goal is: unable to stand   Goal #1 Patient is able to demonstrate supine - sit EOB @ level: Min A     Goal #1   Current Status    Goal #2 Patient is able to demonstrate transfers Sit to/from Stand at assistance l

## 2018-01-31 NOTE — PROGRESS NOTES
1/26/2018  [address] [address]  58/1933  80year old   female    HPI:     The patient complains of pain located left ankle. The pain is consistent with recent surgery. The patient denies numbness and tingling.       REVIEW OF SYSTEMS:   A 12 point review

## 2018-01-31 NOTE — ANESTHESIA POSTPROCEDURE EVALUATION
Patient: Edouard Reynaga    Procedure Summary     Date:  01/30/18 Room / Location:  [address] [place] [address] MAIN OR 04 / [address] [facility] MAIN OR    Anesthesia Start:  0563 Anesthesia Stop:  1248    Procedure:  EXTREMITY LOWER HARDWARE REMOVAL (Left ) Diagnosis:  (wound dehiscence)

## 2018-01-31 NOTE — CM/SW NOTE
CORINNA met with the pt. And her dtr. At bedside. The pt. Was admitted from [facility] in [facility]. The plan is for the pt. To return when medically stable. Updated information has been sent to [facility]. The pt's dtr.  Is aware and agreeabl

## 2018-01-31 NOTE — PROGRESS NOTES
Vascular Access Note  Inserted by Cesar Worrell RN  Vascular Access Screening:   Allergies to Lidocaine: no  Allergies to Latex: no  Presence of Pacemaker/Defibrillator: No  Mastectomy with Lymph Node Dissection: No  AV Fistula / AV Graft: No  Dialysis Cath

## 2018-01-31 NOTE — WOUND PROGRESS NOTE
Wound Care Services  Consulted to see the pt. for a left ankle vac, the pt. is in bed, resting, visiting with her daughter, no complaints at this time. Left foot is elevated, ace wrap intact, no vac machine or dressing observed.  Per the pt's daughter the FAUSTINO

## 2018-01-31 NOTE — CONSULTS
[facility] Infectious Disease  Report of Consultation    Yung Waller Patient Status:  Inpatient    1933 MRN [id number]   Location [facility] 4W/SW/SE Attending Leelee Hdez MD   Hosp Day # 1 PCP Shane Dean MD used smokeless tobacco. She reports that she does not drink alcohol or use drugs.     Allergies:    Sulfa Antibiotics       Hives    Medications:    Current Facility-Administered Medications:   •  Sodium Chloride 0.9 % solution, , ,   •  lactated ringers in sore throat, difficulty swallowing. Respiratory: Negative for cough, sputum, hemoptysis, chest pain, wheezing, dyspnea on exertion, or stridor. Cardiovascular: Negative for chest pain, palpitations, irregular heart beats.    Gastrointestinal:  No abdomi hemorrhage. Nose: Nares normal.   Throat:  Oropharynx clear, MMs moist.   Neck: Trachea ML, no masses. Lungs: CTA b/l no rhonchi, rales, wheezes. Chest wall: No tenderness or deformity. Heart: Regular rate and rhythm, normal S1S2, no murmurs.

## 2018-01-31 NOTE — BRIEF OP NOTE
Pre-Operative Diagnosis: wound dehiscence     Post-Operative Diagnosis: wound dehiscence     Procedure Performed:   Procedure(s):  left ankle incision and drainage with removal deep implanted device, complex closure of wound dehinsene    Surgeon(s) and

## 2018-02-01 VITALS
DIASTOLIC BLOOD PRESSURE: 60 MMHG | BODY MASS INDEX: 28.34 KG/M2 | SYSTOLIC BLOOD PRESSURE: 119 MMHG | HEIGHT: 62 IN | WEIGHT: 154 LBS | OXYGEN SATURATION: 91 % | TEMPERATURE: 98 F | RESPIRATION RATE: 18 BRPM | HEART RATE: 69 BPM

## 2018-02-01 LAB
ANION GAP SERPL CALC-SCNC: 5 MMOL/L (ref 0–18)
BUN SERPL-MCNC: 12 MG/DL (ref 8–20)
BUN/CREAT SERPL: 13 (ref 10–20)
CALCIUM SERPL-MCNC: 9.7 MG/DL (ref 8.5–10.5)
CHLORIDE SERPL-SCNC: 104 MMOL/L (ref 95–110)
CO2 SERPL-SCNC: 30 MMOL/L (ref 22–32)
CREAT SERPL-MCNC: 0.92 MG/DL (ref 0.5–1.5)
GLUCOSE SERPL-MCNC: 96 MG/DL (ref 70–99)
OSMOLALITY UR CALC.SUM OF ELEC: 288 MOSM/KG (ref 275–295)
POTASSIUM SERPL-SCNC: 3.6 MMOL/L (ref 3.3–5.1)
SODIUM SERPL-SCNC: 139 MMOL/L (ref 136–144)

## 2018-02-01 PROCEDURE — 76937 US GUIDE VASCULAR ACCESS: CPT

## 2018-02-01 PROCEDURE — 36569 INSJ PICC 5 YR+ W/O IMAGING: CPT

## 2018-02-01 PROCEDURE — 02HV33Z INSERTION OF INFUSION DEVICE INTO SUPERIOR VENA CAVA, PERCUTANEOUS APPROACH: ICD-10-PCS | Performed by: INTERNAL MEDICINE

## 2018-02-01 PROCEDURE — 97530 THERAPEUTIC ACTIVITIES: CPT

## 2018-02-01 PROCEDURE — 80048 BASIC METABOLIC PNL TOTAL CA: CPT | Performed by: INTERNAL MEDICINE

## 2018-02-01 PROCEDURE — A4216 STERILE WATER/SALINE, 10 ML: HCPCS

## 2018-02-01 RX ORDER — ENOXAPARIN SODIUM 100 MG/ML
40 INJECTION SUBCUTANEOUS DAILY
Qty: 30 SYRINGE | Refills: 0 | Status: SHIPPED | OUTPATIENT
Start: 2018-02-02

## 2018-02-01 RX ORDER — 0.9 % SODIUM CHLORIDE 0.9 %
VIAL (ML) INJECTION
Status: DISCONTINUED
Start: 2018-02-01 | End: 2018-02-01

## 2018-02-01 NOTE — PHYSICAL THERAPY NOTE
PHYSICAL THERAPY TREATMENT NOTE - INPATIENT    Room Number: 428/428-A       Presenting Problem:  (L ankle HWR, NWB LLE)    Problem List  Principal Problem:    Wound dehiscence  Active Problems:    Essential hypertension    Parkinson's disease ([facility] [address]      A commode, etc.): A Lot   -   Moving from lying on back to sitting on the side of the bed?: A Lot   How much help from another person does the patient currently need. ..   -   Moving to and from a bed to a chair (including a wheelchair)?: A Lot   -   Need to

## 2018-02-01 NOTE — PROGRESS NOTES
[facility] AND [facility] Infectious Disease Progress Note    Fadi Cruz Patient Status:  Inpatient    1933 MRN [id number]   Location [facility] 4W/SW/SE Attending Kane Mcmahan MD   Hosp Day # 2 PCP MD Kathrine Houser 2.5 mg, Nebulization, Q6H PRN  •  metoprolol Tartrate (LOPRESSOR) tab 25 mg, 25 mg, Oral, BID    Physical Exam:  General: Alert. Cooperative. No apparent distress.   Vital Signs:  Blood pressure 149/67, pulse 77, temperature 99 °F (37.2 °C), temperature so

## 2018-02-01 NOTE — PROGRESS NOTES
[facility]    Progress Note    Tessa Decree Patient Status:  Inpatient    1933 MRN [id number]   Location [facility] 4W/SW/SE Attending Noah Siu MD   Hosp Day # 2 PCP Eva Headley MD     Subjective:     Cherylene Boston removal, incision/drainage. PICC line will need to be reinserted. Patient will require vancomycin per ID for total of 6 weeks. If available, we'll plan on discharge back to skilled nursing facility today.   Blood pressure is controlled with current medic

## 2018-02-01 NOTE — CM/SW NOTE
The pt. Has been approved to discharge back to rehab. The pt. Is scheduled to discharge to [facility] in [facility] today 2/1 at 6p, via ambulance due to confusion and Parkinson's disease.   The pt's son Yang Loaiza is aware and agreeable to the abov

## 2018-02-01 NOTE — PHYSICAL THERAPY NOTE
AM pt undergoing PICC line placement not available for PT treatment. PT will return in PM as pt availability allows.

## 2018-02-01 NOTE — PROGRESS NOTES
1/26/2018  [address] [address]  58/1933  80year old   female  No name on file. HPI:   No chief complaint on file. The patient pain is controlled and she is eating well.     REVIEW OF SYSTEMS:   A 12 point review of systems was performed as documen

## 2018-02-01 NOTE — PLAN OF CARE
Per night nurse patient was confused and pulled our PICC Line. New PICC line to be inserted prior to discharge.

## 2018-02-01 NOTE — CM/SW NOTE
[facility] Back when she is medically stable. Updated information has been sent to [facility]. The pt's dtr. Is aware, but has to return to work and requested SW contact her brother if discharge orders are received.       Nai Barraza

## 2018-02-01 NOTE — PLAN OF CARE
Patient Centered Care    • Patient preferences are identified and integrated in the patient's plan of care Progressing

## 2018-02-01 NOTE — PROGRESS NOTES
Vascular Access Note  Inserted by Ingrid Srinivasan Rn    Vascular Access Screening:   Allergies to Lidocaine: no  Allergies to Latex: no  Presence of Pacemaker/Defibrillator: No  Mastectomy with Lymph Node Dissection: No  AV Fistula / AV Graft: No  Dialysis Cathete

## 2018-02-01 NOTE — PROGRESS NOTES
Date:  2/1/18  Drug/Duration:      Vancomycin: Started on 1/30, now D#3,   Continue Vancomycin? Trough level due on 2/2 at 11:30 hrs     Indication:  MRSA?  wound infection  Patient is postop day 1 status post left ankle I&D and hardware removal for wound

## 2018-02-01 NOTE — PROGRESS NOTES
[facility] AND [facility] Infectious Disease  Progress Note    Fercho Gonzalez Patient Status:  Inpatient    1933 MRN [id number]   Location [facility] 4W/SW/SE Attending Donal Ahuja MD   Hosp Day # 2 PCP Adan Montero MD     Subject involved infection requiring explantation of hardware. Tentative stop date will be 3/13/18. Anticipate return to ECF at discharge. D/w patient. Keyana Abdul Malik  [facility] Infectious Disease  [phone]    2/1/2018  8:53 AM

## 2018-02-05 NOTE — DISCHARGE SUMMARY
[facility] - [facility]    Discharge Summary    Greer Downs Patient Status:  Inpatient    1933 MRN [id number]   Location [facility] 4W/SW/SE Attending No att. providers found   Hosp Day # 2 PCP Sissy Ken MD     Date of Adm NEEDED   Quantity:  180 mL  Refills:  1        CONTINUE taking these medications      Instructions Prescription details   acetaminophen 325 MG Tabs  Commonly known as:  TYLENOL      Take 650 mg by mouth every 6 (six) hours as needed for Pain.    Refills:  0 medications  · sodium chloride 0.9 % SOLN 250 mL with Vancomycin HCl 1000 MG SOLR 1,000 mg         Follow up Visits:  Follow-up with Dr. Amie Huff in 2 weeks      Bart Friend  2/5/2018  7:22 AM

## 2021-03-12 DIAGNOSIS — Z23 NEED FOR VACCINATION: ICD-10-CM

## 2021-08-24 NOTE — ED NOTES
Attempt to give report x1
Case management contacted to speak with Daughter in regards to home care.
Pt has good sensation to LLE. No swelling noted. LLE elevated and ice applied.
Received pt via EMS. Pt is A&OX3. Pt presents to ED for Left ankle pain r/t \"twisting ankle\". Pt denies hitting her head or loc. Pt has a nonproductive cough. Cristi fevers, chills, cp, or sob. Pt's lung sounds clear bilaterally.  Pt resting in bed comfort
24-Aug-2021 05:47
Rajiv Reed

## 2021-11-03 NOTE — OCCUPATIONAL THERAPY NOTE
OCCUPATIONAL THERAPY EVALUATION - INPATIENT      Room Number: 415/415-A  Evaluation Date: 12/16/2017  Type of Evaluation: Initial  Presenting Problem:  (L ankle bimalleolar ORIF due to fx)    Physician Order: IP Consult to Occupational Therapy  Reason for Recommendations: TBD    PLAN  OT Treatment Plan: Balance activities; Energy conservation/work simplification techniques;ADL training;Functional transfer training;UE strengthening/ROM; Endurance training;Patient/Family education;Cognitive reorientation;Patien Repositioning    ACTIVITY TOLERANCE  1LNC at rest: 95%  1LNC with activity; 97%    COGNITION  Overall Cognitive Status:  Impaired  Orientation Level:  oriented to person  Following Commands:  follows one step commands with repetition  Safety Judgement:  Ve self-stated goal is: Rest     Patient will complete UE dressing with max. A with verbal instructions. Comment:     Patient will complete toilet transfer with max. A x1 stand pivot transfer while adhering to LLE NWB.    Comment:     Patient will complete s Cimetidine Counseling:  I discussed with the patient the risks of Cimetidine including but not limited to gynecomastia, headache, diarrhea, nausea, drowsiness, arrhythmias, pancreatitis, skin rashes, psychosis, bone marrow suppression and kidney toxicity.

## (undated) DEVICE — C-ARMOR C-ARM EQUIPMENT COVERS CLEAR STERILE UNIVERSAL FIT 12 PER CASE: Brand: C-ARMOR

## (undated) DEVICE — STERILE TETRA-FLEX CF, ELASTIC BANDAGE, 4" X 5.5YD: Brand: TETRA-FLEX™CF

## (undated) DEVICE — COTTON UNDERCAST PADDING,REGULAR FINISH: Brand: WEBRIL

## (undated) DEVICE — DRAPE SHEET LG

## (undated) DEVICE — SUTURE PDS II 2-0 CT-2

## (undated) DEVICE — BLADE 20 SHRP BP SS SRG STRL

## (undated) DEVICE — SUTURE VICRYL 0 CP-1

## (undated) DEVICE — SPLINT PLASTER 5

## (undated) DEVICE — CHLORAPREP 26ML APPLICATOR

## (undated) DEVICE — DRAPE SRG 70X60IN SPLT U IMPRV

## (undated) DEVICE — LOWER EXTREMITY: Brand: MEDLINE INDUSTRIES, INC.

## (undated) DEVICE — GAUZE SPONGES,12 PLY: Brand: CURITY

## (undated) DEVICE — SUTURE PROLENE 3-0 8687H

## (undated) DEVICE — IMPLANTABLE DEVICE
Type: IMPLANTABLE DEVICE | Site: ANKLE | Status: NON-FUNCTIONAL
Removed: 2017-12-15

## (undated) DEVICE — 3M™ COBAN™ NL STERILE NON-LATEX SELF-ADHERENT WRAP, 2084S, 4 IN X 5 YD (10 CM X 4,5 M), 18 ROLLS/CASE: Brand: 3M™ COBAN™

## (undated) DEVICE — STERILE LATEX POWDER-FREE SURGICAL GLOVESWITH NITRILE COATING: Brand: PROTEXIS

## (undated) DEVICE — INTENDED FOR TISSUE SEPARATION, AND OTHER PROCEDURES THAT REQUIRE A SHARP SURGICAL BLADE TO PUNCTURE OR CUT.: Brand: BARD-PARKER ® STAINLESS STEEL BLADES

## (undated) DEVICE — OCCLUSIVE GAUZE STRIP OVERWRAP,3% BISMUTH TRIBROMOPHENATE IN PETROLATUM BLEND: Brand: XEROFORM

## (undated) DEVICE — COVER SGL STRL LGHT HNDL BLU

## (undated) DEVICE — SOL  .9 1000ML BTL

## (undated) DEVICE — DRAPE C-ARM UNIVERSAL

## (undated) DEVICE — SUTURE VICRYL 2-0 FS-1

## (undated) DEVICE — TOWEL OR BLU 16X26 STRL

## (undated) DEVICE — REM POLYHESIVE ADULT PATIENT RETURN ELECTRODE: Brand: VALLEYLAB

## (undated) DEVICE — BATTERY

## (undated) DEVICE — FAN SPRAY KIT: Brand: PULSAVAC®

## (undated) DEVICE — SUCTION CANISTER, 3000CC,SAFELINER: Brand: DEROYAL

## (undated) DEVICE — PADDING 4YDX6IN CTTN STRL WBRL

## (undated) DEVICE — OCCLUSIVE GAUZE STRIP,3% BISMUTH TRIBROMOPHENATE IN PETROLATUM BLEND: Brand: XEROFORM

## (undated) DEVICE — SUTURE ETHILON 3-0 669H

## (undated) DEVICE — TETRA-FLEX CF WOVEN LATEX FREE ELASTIC BANDAGE 6" X 5.5 YD: Brand: TETRA-FLEX™CF

## (undated) DEVICE — TETRA-FLEX CF WOVEN LATEX FREE ELASTIC BANDAGE 4" X 5.5 YD: Brand: TETRA-FLEX™CF

## (undated) DEVICE — STERILE POLYISOPRENE POWDER-FREE SURGICAL GLOVES: Brand: PROTEXIS

## (undated) DEVICE — ZIMMER® STERILE DISPOSABLE TOURNIQUET CUFF WITH PLC, DUAL PORT, SINGLE BLADDER, 34 IN. (86 CM)

## (undated) NOTE — IP AVS SNAPSHOT
Patient Demographics     Address  [address] [place] [address] Phone  [phone] [facility])  [phone] (Mobile) *Preferred* E-mail Address  [email or web address]      Emergency Contact(s)     Name Relation Home Work Mobile    Cameron Guillen These medications were sent to  [address], [address] [facility], [phone], [place] [address] [address]    Phone:  [phone]   hydrochlorothiazide 25 MG Tabs Glucose 106 70 - 99 mg/dL [facility]   Sodium 140 136 - 144 mmol/L — [facility]   Potassium 3.5 3.3 - 5.1 mmol/L — [facility]   Chloride 103 95 - 110 mmol/L — [facility]   CO2 28 22 - 32 mmol/L — [facility]   BUN 11 8 - 20 mg/dL [facility] Order Status:  Completed Lab Status:  Final result Updated:  12/15/17 0824    Specimen:  Other from [facility]      MRSA Screen By PCR Negative         H&P - H&P Note      H&P signed by Burke Yoon MD at 12/14/2017  6:41 PM  Version 1 of 1    Author:    receiving IV pain medications. Family denied syncope, fevers, chills, dysuria, chest pain or dyspnea.   She has had a recurrent pneumonia that was last treated with antibiotics in August.  Since then, she has needed pulmonary toilet with nebulizers along w Review of Systems:     GENERAL HEALTH: No fevers or chills    SKIN: Denies any unusual skin lesions or rashes, no sacral sores   HEENT: No visual deficits, no choking    RESPIRATORY: Denies shortness of breath, ANTUNEZ or wheezing, no productive cough   CARDIO Xr Chest Pa + Lat Chest (kab=[id number])    Result Date: 12/14/2017  CONCLUSION:  1. Atherosclerosis. 2. Scarring/atelectasis. 3. Kyphoscoliosis. 4. Demineralization. 5. Osteoarthritis. 6. Chronic appearing wedging of multiple vertebra.          Xr Ankle (min 3 :  Armand Adan MD (Physician)     Consult Orders:    1.  IP consult to Orthopedic Surgery Once [[id number]] ordered by Sender, Arvella Lesches, MD at 12/14/17 1406              12/14/2017  Estelle Sharpe  58/1933  80year old   female  No name on file • Parkinson disease [facility])       Past Surgical History:  No date: OTHER      Comment: bilateral hip replacement  No date: OTHER      Comment: left inguinal hernia   Family History   Problem Relation Age of Onset   • Breast Cancer Sister    • Hypertension Br The risks, indications, benefits, and procedures of both operative and non-operative treatment were discussed with the patient. The patient desired surgery.   Surgery recommended was left ankle open reduction internal fixation of her bimalleolar ankle fr from supine to the EOB. Pt required assist with scooting forward with the blue pad. Pt was also able to provide some assistance with the transfer. Pt sat EOB for about 4 minutes with min A/CGA for sitting balance.  Pt with posterior lean and cued for corre -   Moving from lying on back to sitting on the side of the bed?: A Lot   How much help from another person does the patient currently need. ..   -   Moving to and from a bed to a chair (including a wheelchair)?: A Lot   -   Need to walk in hospital room?: Physical Therapy Note signed by Fermin Walters PTA at 12/18/2017  1:18 PM  Version 1 of 1    Author:  Fermin Walters PTA Service:  Rehab Author Type:  Physical Therapist    Filed:  12/18/2017  1:18 PM Date of Service:  12/18/2017  1:09 PM Status: WEIGHT BEARING RESTRICTION  Weight Bearing Restriction: L lower extremity           L Lower Extremity: Non-Weight Bearing    PAIN ASSESSMENT   Rating:  (did not rate)  Location: L ankle  Management Techniques: Activity promotion; Body mechanics; Relaxation;R Goal #1 Patient is able to demonstrate supine - sit EOB @ level: minimum assistance     Goal #1   Current Status Max A    Goal #2 Patient is able to demonstrate transfers EOB to/from Chair/Wheelchair at assistance level: minimum assistance with least restr the therapist and with her L foot on top of the therapists foot to maintain her NWB status during the transfer. Pt was able to assist with transfer on her R LE. Pt is left in the chair with all needs within reach.  Pt is on track to dc to rehab once medical PT Approx Degree of Impairment Score: 76.75%   Standardized Score (AM-PAC Scale): 32.29   CMS Modifier (G-Code): CL    FUNCTIONAL ABILITY STATUS  Gait Assessment   Gait Assistance: Not tested  Distance (ft): SPT  Assistive Device: None     [employer] 3/8/2018 11:00 AM Denver Hamming, NP [facility] [place], [address] [facility]

## (undated) NOTE — IP AVS SNAPSHOT
[facility]            (For Outpatient Use Only) Initial Admit Date: 12/14/2017   Inpt/Obs Admit Date: Inpt: 12/14/17 / Obs: N/A   Discharge Date:    Quang Michelle:  [de-identified]   MRN: [de-identified]   CSN: [id number]        ENCOUNTER  Patient Cla Subscriber Name:  Murtis Bill :    Subscriber ID:  Pt Rel to Subscriber:    Hospital Account Financial Class: Medicare    2017

## (undated) NOTE — IP AVS SNAPSHOT
Patient Demographics     Address  [address] [address] Phone  [phone] [facility])  [phone] (Mobile) *Preferred* E-mail Address  [email or web address]      Emergency Contact(s)     Name Relation Home Work Mobile    Cameron Guillen hydrochlorothiazide 25 MG Tabs  Commonly known as:  HYDRODIURIL      Take 1 tablet (25 mg total) by mouth daily. Kathy Parisi MD         metoprolol Tartrate 25 MG Tabs  Commonly known as:  LOPRESSOR      Take 25 mg by mouth 2 (two) times daily. [id number] metoprolol Tartrate (LOPRESSOR) tab 25 mg 01/31/18 2100 Given      [id number] metoprolol Tartrate (LOPRESSOR) tab 25 mg 02/01/18 0857 Given            RIGHT LOWER ABDOMEN     Order ID Medication Name Action Time Action Reason Comments    [id number] Testing Performed By     Susan Mcfadden Name Director Address Valid Date Range    [address] [facility] [place] [facility] Kelly Duque. Chasidy Lynn M.D. [facility]. [address]  [facility] [place] [place] [address] 04/29/14 1547 - Present            Microbiology Results (All)     P • Pulmonary embolism ([facility] [address] 01/20/2018   • Short-term memory loss 01/2018    confirmed by patients son Rosenda Pimple   • Shortness of breath 01/27/2018    on O2 N/C r/t PE diagnosis     Past Surgical History:  12/2017: FRACTURE SURGERY Left      Comment: Left ank triamcinolone acetonide 0.5 % External Ointment One application twice a day, not to exceed 10 days at at time   MULTIVITAMIN TAB/CAP Take 1 tablet by mouth daily. Ascorbic Acid (VITAMIN C) 100 MG Oral Tab Take 100 mg by mouth daily.        Review of Syste BUN 16 01/31/2018    01/31/2018   K 4.6 01/31/2018    01/31/2018   CO2 28 01/31/2018    (H) 01/31/2018   CA 10.0 01/31/2018   ALB 2.9 (L) 12/18/2017   ALKPHO 111 (H) 12/14/2017   BILT 0.9 12/14/2017   TP 6.3 12/14/2017   AST 26 12/14/201 1. IP consult to Infectious Disease Once [[id number]] ordered by Chip Garcia MD at 18 [facility] Infectious Disease  Report of Consultation[MT. 1]    Anneliese Lange[MT. 2] Patient Status:  Inpatient    / • diverticulosis [OTHER] Mother    • coronary artery disease [OTHER] Father    • Breast Cancer Sister    • Hypertension Brother    • Stroke Brother       reports that she has quit smoking.  She has never used smokeless tobacco. She reports that she does not mg, 2.5 mg, Nebulization, Q6H PRN  •  metoprolol Tartrate (LOPRESSOR) tab 25 mg, 25 mg, Oral, BID[MT. 2]    Review of Systems:    Constitutional:  No fevers, chills, diaphoresis, weight changes.    HEENT:  No visual changes, oral ulcers, sore throat, difficu Intake/Output:[MT.1]  No intake/output data recorded. [MT.2]    Physical Exam:   General: Awake, alert, non-tox and in NAD. Head: Normocephalic, without obvious abnormality, atraumatic. Eyes: Conjunctivae/corneas clear. No scleral icterus.   No conjunct MT.1 - Dragan Shear, DO on 1/31/2018  2:35 PM  MT.2 - Dragan Shear, DO on 1/31/2018  2:36 PM                     D/C Summary     No notes of this type exist for this encounter.          Physical Therapy Notes (last 72 hours) (Notes from 1/29 Weight Bearing Restriction: L lower extremity           L Lower Extremity: Non-Weight Bearing[DF.2]    PAIN ASSESSMENT[DF.1]   Ratin[DF.2]          BALANCE[DF.1] Goals to be met by: 2/14/17  Patient Goal Patient's self-stated goal is: unable to stand   Goal #1 Patient is able to demonstrate supine - sit EOB @ level: Min A   Goal #1   Current Status  max A   Goal #2 Patient is able to demonstrate transfers Sit to/fr Reason for Therapy: Mobility Dysfunction and Discharge Planning    PHYSICAL THERAPY ASSESSMENT     Patient is a[AD.3 80year old[AD.2] female admitted 1/30/2018 for L ankle HWR.   Patient's current functional deficits include ambulation, transfers & bed mo SUBJECTIVE  Unable to communicate    PHYSICAL THERAPY EXAMINATION     OBJECTIVE[AD.1]  Precautions:  Other (Comment)  Fall Risk:  (L LE cast)[AD.2]    WEIGHT BEARING RESTRICTION[AD.1]  Weight Bearing Restriction: L lower extremity           L Lower Extremit Patient Goal Patient's self-stated goal is: unable to stand   Goal #1 Patient is able to demonstrate supine - sit EOB @ level: Min A     Goal #1   Current Status    Goal #2 Patient is able to demonstrate transfers Sit to/from Stand at assistance level: min

## (undated) NOTE — IP AVS SNAPSHOT
[facility]            (For Outpatient Use Only) Initial Admit Date: 1/30/2018   Inpt/Obs Admit Date: Inpt: 1/30/18 / Obs: N/A   Discharge Date:    Ludivina Robert:  [de-identified]   MRN: [de-identified]   CSN: [id number]        ENCOUNTER  Patient Class Subscriber Name:  Pete Doris :    Subscriber ID:  Pt Rel to Subscriber:    Hospital Account Financial Class: Medicare    2018